# Patient Record
Sex: MALE | Race: WHITE
[De-identification: names, ages, dates, MRNs, and addresses within clinical notes are randomized per-mention and may not be internally consistent; named-entity substitution may affect disease eponyms.]

---

## 2023-01-02 ENCOUNTER — HOSPITAL ENCOUNTER (EMERGENCY)
Dept: HOSPITAL 75 - ER | Age: 63
Discharge: HOME | End: 2023-01-02
Payer: SELF-PAY

## 2023-01-02 VITALS — HEIGHT: 70.98 IN | BODY MASS INDEX: 23.15 KG/M2 | WEIGHT: 165.35 LBS

## 2023-01-02 VITALS — DIASTOLIC BLOOD PRESSURE: 93 MMHG | SYSTOLIC BLOOD PRESSURE: 141 MMHG
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
## 2023-01-02 DIAGNOSIS — Z28.310: ICD-10-CM

## 2023-01-02 DIAGNOSIS — F12.90: ICD-10-CM
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
## 2023-01-02 DIAGNOSIS — S39.91XA: Primary | ICD-10-CM

## 2023-01-02 DIAGNOSIS — F17.210: ICD-10-CM

## 2023-01-02 DIAGNOSIS — Z87.19: ICD-10-CM

## 2023-01-02 DIAGNOSIS — F15.90: ICD-10-CM

## 2023-01-02 LAB
ALBUMIN SERPL-MCNC: 4.5 GM/DL (ref 3.2–4.5)
ALP SERPL-CCNC: 93 U/L (ref 40–136)
ALT SERPL-CCNC: 38 U/L (ref 0–55)
AMYLASE SERPL-CCNC: 94 U/L (ref 25–125)
APTT BLD: 29 SEC (ref 24–35)
APTT PPP: YELLOW S
BACTERIA #/AREA URNS HPF: (no result) /HPF
BARBITURATES UR QL: NEGATIVE
BASOPHILS # BLD AUTO: 0.1 10^3/UL (ref 0–0.1)
BASOPHILS NFR BLD AUTO: 1 % (ref 0–10)
BENZODIAZ UR QL SCN: NEGATIVE
BILIRUB SERPL-MCNC: 0.4 MG/DL (ref 0.1–1)
BILIRUB UR QL STRIP: NEGATIVE
BUN/CREAT SERPL: 16
CALCIUM SERPL-MCNC: 9.3 MG/DL (ref 8.5–10.1)
CHLORIDE SERPL-SCNC: 102 MMOL/L (ref 98–107)
CK MB SERPL-MCNC: 1.4 NG/ML (ref ?–6.6)
CK SERPL-CCNC: 89 U/L (ref 30–200)
CO2 SERPL-SCNC: 23 MMOL/L (ref 21–32)
COCAINE UR QL: NEGATIVE
CREAT SERPL-MCNC: 0.86 MG/DL (ref 0.6–1.3)
EOSINOPHIL # BLD AUTO: 0.2 10^3/UL (ref 0–0.3)
EOSINOPHIL NFR BLD AUTO: 2 % (ref 0–10)
FIBRINOGEN PPP-MCNC: CLEAR MG/DL
GFR SERPLBLD BASED ON 1.73 SQ M-ARVRAT: 97 ML/MIN
GLUCOSE SERPL-MCNC: 102 MG/DL (ref 70–105)
GLUCOSE UR STRIP-MCNC: NEGATIVE MG/DL
HCT VFR BLD CALC: 47 % (ref 40–54)
HGB BLD-MCNC: 15.2 G/DL (ref 13.3–17.7)
INR PPP: 0.9 (ref 0.8–1.4)
KETONES UR QL STRIP: NEGATIVE
LEUKOCYTE ESTERASE UR QL STRIP: NEGATIVE
LYMPHOCYTES # BLD AUTO: 2.1 10^3/UL (ref 1–4)
LYMPHOCYTES NFR BLD AUTO: 25 % (ref 12–44)
MAGNESIUM SERPL-MCNC: 2.1 MG/DL (ref 1.6–2.4)
MANUAL DIFFERENTIAL PERFORMED BLD QL: NO
MCH RBC QN AUTO: 30 PG (ref 25–34)
MCHC RBC AUTO-ENTMCNC: 33 G/DL (ref 32–36)
MCV RBC AUTO: 91 FL (ref 80–99)
METHADONE UR QL SCN: NEGATIVE
MONOCYTES # BLD AUTO: 0.5 10^3/UL (ref 0–1)
MONOCYTES NFR BLD AUTO: 6 % (ref 0–12)
NEUTROPHILS # BLD AUTO: 5.5 10^3/UL (ref 1.8–7.8)
NEUTROPHILS NFR BLD AUTO: 65 % (ref 42–75)
NITRITE UR QL STRIP: NEGATIVE
OPIATES UR QL SCN: NEGATIVE
OXYCODONE UR QL: NEGATIVE
PH UR STRIP: 5.5 [PH] (ref 5–9)
PLATELET # BLD: 261 10^3/UL (ref 130–400)
PMV BLD AUTO: 10 FL (ref 9–12.2)
POTASSIUM SERPL-SCNC: 4.7 MMOL/L (ref 3.6–5)
PROPOXYPH UR QL: NEGATIVE
PROT SERPL-MCNC: 7.4 GM/DL (ref 6.4–8.2)
PROT UR QL STRIP: NEGATIVE
PROTHROMBIN TIME: 12.3 SEC (ref 12.2–14.7)
RBC #/AREA URNS HPF: (no result) /HPF
SODIUM SERPL-SCNC: 136 MMOL/L (ref 135–145)
SP GR UR STRIP: 1.02 (ref 1.02–1.02)
SQUAMOUS #/AREA URNS HPF: (no result) /HPF
TRICYCLICS UR QL SCN: NEGATIVE
WBC # BLD AUTO: 8.4 10^3/UL (ref 4.3–11)
WBC #/AREA URNS HPF: (no result) /HPF

## 2023-01-02 PROCEDURE — 80306 DRUG TEST PRSMV INSTRMNT: CPT

## 2023-01-02 PROCEDURE — 83874 ASSAY OF MYOGLOBIN: CPT

## 2023-01-02 PROCEDURE — 85730 THROMBOPLASTIN TIME PARTIAL: CPT

## 2023-01-02 PROCEDURE — 82553 CREATINE MB FRACTION: CPT

## 2023-01-02 PROCEDURE — 93041 RHYTHM ECG TRACING: CPT

## 2023-01-02 PROCEDURE — 71260 CT THORAX DX C+: CPT

## 2023-01-02 PROCEDURE — 82550 ASSAY OF CK (CPK): CPT

## 2023-01-02 PROCEDURE — 85025 COMPLETE CBC W/AUTO DIFF WBC: CPT

## 2023-01-02 PROCEDURE — 85610 PROTHROMBIN TIME: CPT

## 2023-01-02 PROCEDURE — 80053 COMPREHEN METABOLIC PANEL: CPT

## 2023-01-02 PROCEDURE — 81000 URINALYSIS NONAUTO W/SCOPE: CPT

## 2023-01-02 PROCEDURE — 71045 X-RAY EXAM CHEST 1 VIEW: CPT

## 2023-01-02 PROCEDURE — 74177 CT ABD & PELVIS W/CONTRAST: CPT

## 2023-01-02 PROCEDURE — 36415 COLL VENOUS BLD VENIPUNCTURE: CPT

## 2023-01-02 PROCEDURE — 99283 EMERGENCY DEPT VISIT LOW MDM: CPT

## 2023-01-02 PROCEDURE — 80320 DRUG SCREEN QUANTALCOHOLS: CPT

## 2023-01-02 PROCEDURE — 82150 ASSAY OF AMYLASE: CPT

## 2023-01-02 PROCEDURE — 83735 ASSAY OF MAGNESIUM: CPT

## 2023-01-02 NOTE — DIAGNOSTIC IMAGING REPORT
EXAMINATION: CT chest, abdomen and pelvis with intravenous

contrast.



TECHNIQUE: Multiple contiguous axial images were obtained through

the chest, abdomen and pelvis after the uneventful administration

of intravenous contrast. All CT scans use one or more of the

following dose optimizing techniques: automated exposure control,

MA and/or KvP adjustment based on patient size and exam type or

iterative reconstruction. 



HISTORY: Trauma. Fall. Chest and abdominal pain. Back pain.



COMPARISON: None available.



FINDINGS: 



CT CHEST:

The heart size is within normal limits. No pericardial effusion

is present. There is calcified aortic and coronary

atherosclerotic plaque.



There is no mediastinal, hilar, or axillary lymphadenopathy. 



Centrilobular emphysema is seen in the lungs. The lungs

demonstrate no pulmonary nodules or masses. There are no focal

areas of consolidation. No central endobronchial obstructing

lesions are identified. 



There are no pleural effusions or pneumothorax. 



The osseous structures demonstrate no acute abnormalities. 



CT ABDOMEN AND PELVIS:

The liver, spleen, pancreas, adrenal glands, and kidneys have an

unremarkable appearance without acute abnormalities.. Simple

cortical cysts are seen in both kidneys. The gallbladder is

nondistended. There is no pathologically enlarged mesenteric or

retroperitoneal adenopathy. 



The bowel loops are nondilated. The appendix is visualized in the

right lower quadrant and has a normal appearance. There is no

free fluid or free air. 



The osseous structures demonstrate no acute abnormalities. There

is calcified aortic and iliac atherosclerotic plaque.



Ureters and bladder have a normal appearance. 



There is no free air, loculated collection, or adenopathy in the

pelvis. 



IMPRESSION:

1. No acute abnormalities in the chest, abdomen and pelvis.

2. Centrilobular emphysema.



Agree with overnight report. 





Dictated by: 



  Dictated on workstation # [id number]

## 2023-01-02 NOTE — ED BACK PAIN
General


Chief Complaint:  Back Problems


Stated Complaint:  FALL,BACK PAIN


Nursing Triage Note:  


PT AMB TO ED BY POV WITH SON WITH C/O BACK INJURY. PT REPORTS HE FELL AND HIT 


BACK ON THE CORNER OF THE BED APPROX 2000 THIS EVENING. DESCRIBES PAIN AS 


BURNING AND RATES PAIN 7/10, 10/10 WITH MVMT.


Source of Information:  Patient (DIFFICULT HISTORIAN. SPEECH RAPID AND ERRATIC 

AND DIFFICULT TO UNDERSTAND. )





History of Present Illness


Date Seen by Provider:  Jan 2, 2023


Time Seen by Provider:  01:25


Initial Comments


PT ARRIVES VIA POV WITH HIS SON


PT STATES THAT AROUND 2000 TONIGHT, HE FELL AND LANDED ON THE CORNER OF A 

BED--PT AND SON ARE STAYING AT [facility]


HE C/O PAIN TO RIGHT FLANK AREA--STATES IT IS A BURNING PAIN


HE RATES PAIN 7/10 AT REST, 10/10 WITH MOVEMENT


HE STATES HE TOOK 3 BABY ASPIRIN TONIGHT. HAS NOT TAKEN ANYTHING ELSE FOR PAIN 


NO NAUSEA/VOMITING/DIARRHEA


NO DIFFICULTY URINATING


NO DIFFICULTY BREATHING





HE DENIES HITTING HIS HEAD OR HAVING LOSS OF CONSCIOUSNESS


DENIES NECK PAIN 


NO PARESTHESIAS OR MOTOR DEFICITS. 





HE HAD A "FEW BEERS" TONIGHT


HE DENIES DRUG USE





HE STATES HIS ONLY MEDICAL PROBLEM IS HTN. 





HE IS TRAVELING WITH HIS SON, WHO TRAVELS FOR WORK. 


PT STATES HE IS FROM [place]. 


SON REPORTS THEY WILL BE GOING BACK ON THE 9TH OF THIS MONTH.





Allergies and Home Medications


Allergies


Coded Allergies:  


     No Known Drug Allergies (Unverified , 1/2/23)





Review of Systems


Constitutional:  no symptoms reported


EENTM:  no symptoms reported


Respiratory:  no symptoms reported


Cardiovascular:  no symptoms reported


Gastrointestinal:  no symptoms reported


Genitourinary:  no symptoms reported


Musculoskeletal:  see HPI


Skin:  no symptoms reported


Psychiatric/Neurological:  No Symptoms Reported





Past Medical-Social-Family Hx


Patient Social History


Tobacco Use?:  Yes


Tobacco type used:  Cigarettes


Smoking Status:  Current Everyday Smoker


Use of E-Cig and/or Vaping dev:  No


Substance use?:  Yes


Substance type:  Methamphetamine, Marijuana


Alcohol Use?:  Yes


Alcohol type:  Beer


Alcohol Frequency:  Daily


Pt feels they are or have been:  No





Immunizations Up To Date


Influenza Vaccine Up-to-Date:  No; Not Current





Past Medical History


Surgery/Hospitalization HX:  


htn, hernia


Surgeries:  Yes (HERNIA REPAIR)


Abdominal


Respiratory:  No


Cardiac:  Yes


Hypertension


Neurological:  No


Genitourinary:  No


Gastrointestinal:  No


Musculoskeletal:  No


Endocrine:  No


HEENT:  No


Cancer:  No


Psychosocial:  No


Integumentary:  No


Blood Disorders:  No





Family Medical History








SOCIAL HISTORY:


-SMOKES 1 PPD


-REGULAR ETOH USE--WILL NOT STATE HOW MUCH HE NORMALLY DRINKS


-PT DENIES DRUG USE, BUT UDS IS + FOR METHAMPHETAMINS AND MARIJUANA ON


01/02/23





Physical Exam


Vital Signs





Vital Signs - First Documented








 1/2/23





 01:14


 


Temp 36.0


 


Pulse 61


 


Resp 16


 


B/P (MAP) 141/93 (109)


 


Pulse Ox 99


 


O2 Delivery Room Air





Capillary Refill : Less Than 3 Seconds


Height, Weight, BMI


Height: '"


Weight: lbs. oz. kg; 23.00 BMI


Method:


General Appearance:  No Apparent Distress, WD/WN, Other (PT IS SITTING ON SIDE 

OF BED READING A BOOK, AND APPEARS CALM. THEN ON EXAM, PT IS TALKING RAPIDLY AND

ERRATICALLY AND DIFFICULT TO KEEP ON SUBJECT AND WITH CONSTANT MOVEMENTS. 

MARKEDLY EXAGGERATED PAIN RESPONSE. PT WALKS UPRIGHT WITHOUT DIFFICULTY. )


HEENT:  PERRL/EOMI, Other (POOR DENTITION)


Neck:  Full Range of Motion, Normal Inspection, Non Tender, Supple


Cardiovascular:  Regular Rate, Rhythm, No Murmur


Respiratory:  Normal Breath Sounds, No Accessory Muscle Use, No Respiratory 

Distress, Other (RIGHT POSTERIOR AND LATERAL LOWER RIB AREAS. NO CREPITANCE OR 

DEFORMITY OR SUB Q AIR. )


Gastrointestinal:  Normal Bowel Sounds, Soft, Tenderness (RIGHT FLANK, AND 

ENTIRE RIGHT SIDE OF ABDOMEN TENDER. )


Back:  No Vertebral Tenderness, CVA Tenderness (R), Other (NO EXTERNAL EVIDENCE 

OF TRAUMA ANYWHERE)


Extremity:  Normal Capillary Refill, Normal Inspection, Normal Range of Motion, 

Non Tender, No Calf Tenderness, No Pedal Edema


Neurologic/Psychiatric:  Alert, Oriented x3, No Motor/Sensory Deficits, CNs II-

XII Norm as Tested


Skin:  Normal Color, Warm/Dry; No Rash; Other (NO EXTERNAL EVIDENCE OF TRAUMA 

ANYWHERE)





Progress/Results/Core Measures


Results/Orders


Lab Results





Laboratory Tests








Test


 1/2/23


01:40 1/2/23


01:53 Range/Units


 


 


Urine Color YELLOW    


 


Urine Clarity CLEAR    


 


Urine pH 5.5   5-9  


 


Urine Specific Gravity 1.025 H  1.016-1.022  


 


Urine Protein NEGATIVE   NEGATIVE  


 


Urine Glucose (UA) NEGATIVE   NEGATIVE  


 


Urine Ketones NEGATIVE   NEGATIVE  


 


Urine Nitrite NEGATIVE   NEGATIVE  


 


Urine Bilirubin NEGATIVE   NEGATIVE  


 


Urine Urobilinogen 0.2   < = 1.0  MG/DL


 


Urine Leukocyte Esterase NEGATIVE   NEGATIVE  


 


Urine RBC (Auto) NEGATIVE   NEGATIVE  


 


Urine RBC NONE    /HPF


 


Urine WBC 0-2    /HPF


 


Urine Squamous Epithelial


Cells 2-5 


 


  /HPF





 


Urine Crystals NONE    /LPF


 


Urine Bacteria TRACE    /HPF


 


Urine Casts NONE    /LPF


 


Urine Mucus SMALL H   /LPF


 


Urine Culture Indicated NO    


 


Urine Opiates Screen NEGATIVE   NEGATIVE  


 


Urine Oxycodone Screen NEGATIVE   NEGATIVE  


 


Urine Methadone Screen NEGATIVE   NEGATIVE  


 


Urine Propoxyphene Screen NEGATIVE   NEGATIVE  


 


Urine Barbiturates Screen NEGATIVE   NEGATIVE  


 


Ur Tricyclic Antidepressants


Screen NEGATIVE 


 


 NEGATIVE  





 


Urine Phencyclidine Screen NEGATIVE   NEGATIVE  


 


Urine Amphetamines Screen NEGATIVE   NEGATIVE  


 


Urine Methamphetamines Screen POSITIVE H  NEGATIVE  


 


Urine Benzodiazepines Screen NEGATIVE   NEGATIVE  


 


Urine Cocaine Screen NEGATIVE   NEGATIVE  


 


Urine Cannabinoids Screen POSITIVE H  NEGATIVE  


 


White Blood Count


 


 8.4 


 4.3-11.0


10^3/uL


 


Red Blood Count


 


 5.16 


 4.30-5.52


10^6/uL


 


Hemoglobin  15.2  13.3-17.7  g/dL


 


Hematocrit  47  40-54  %


 


Mean Corpuscular Volume  91  80-99  fL


 


Mean Corpuscular Hemoglobin  30  25-34  pg


 


Mean Corpuscular Hemoglobin


Concent 


 33 


 32-36  g/dL





 


Red Cell Distribution Width  13.6  10.0-14.5  %


 


Platelet Count


 


 261 


 130-400


10^3/uL


 


Mean Platelet Volume  10.0  9.0-12.2  fL


 


Immature Granulocyte % (Auto)  1   %


 


Neutrophils (%) (Auto)  65  42-75  %


 


Lymphocytes (%) (Auto)  25  12-44  %


 


Monocytes (%) (Auto)  6  0-12  %


 


Eosinophils (%) (Auto)  2  0-10  %


 


Basophils (%) (Auto)  1  0-10  %


 


Neutrophils # (Auto)


 


 5.5 


 1.8-7.8


10^3/uL


 


Lymphocytes # (Auto)


 


 2.1 


 1.0-4.0


10^3/uL


 


Monocytes # (Auto)


 


 0.5 


 0.0-1.0


10^3/uL


 


Eosinophils # (Auto)


 


 0.2 


 0.0-0.3


10^3/uL


 


Basophils # (Auto)


 


 0.1 


 0.0-0.1


10^3/uL


 


Immature Granulocyte # (Auto)


 


 0.0 


 0.0-0.1


10^3/uL


 


Prothrombin Time  12.3  12.2-14.7  SEC


 


INR Comment  0.9  0.8-1.4  


 


Activated Partial


Thromboplast Time 


 29 


 24-35  SEC





 


Sodium Level  136  135-145  MMOL/L


 


Potassium Level  4.7  3.6-5.0  MMOL/L


 


Chloride Level  102    MMOL/L


 


Carbon Dioxide Level  23  21-32  MMOL/L


 


Anion Gap  11  5-14  MMOL/L


 


Blood Urea Nitrogen  14  7-18  MG/DL


 


Creatinine


 


 0.86 


 0.60-1.30


MG/DL


 


Estimat Glomerular Filtration


Rate 


 97 


  





 


BUN/Creatinine Ratio  16   


 


Glucose Level  102    MG/DL


 


Calcium Level  9.3  8.5-10.1  MG/DL


 


Corrected Calcium  8.9  8.5-10.1  MG/DL


 


Magnesium Level  2.1  1.6-2.4  MG/DL


 


Total Bilirubin  0.4  0.1-1.0  MG/DL


 


Aspartate Amino Transf


(AST/SGOT) 


 31 


 5-34  U/L





 


Alanine Aminotransferase


(ALT/SGPT) 


 38 


 0-55  U/L





 


Alkaline Phosphatase  93    U/L


 


Total Creatine Kinase  89    U/L


 


Creatine Kinase MB  1.4  <6.6  NG/ML


 


Myoglobin


 


 40.4 


 10.0-92.0


NG/ML


 


Total Protein  7.4  6.4-8.2  GM/DL


 


Albumin  4.5  3.2-4.5  GM/DL


 


Amylase Level  94    U/L


 


Serum Alcohol  < 10  <10  MG/DL








My Orders





Orders - DENISE CARRERA DO


Ed Iv/Invasive Line Start (1/2/23 01:27)


Monitor-Rhythm Ecg Trace Only (1/2/23 01:27)


Alcohol (1/2/23 01:27)


Amylase (1/2/23 01:27)


Cbc With Automated Diff (1/2/23 01:27)


Comprehensive Metabolic Panel (1/2/23 01:27)


Creatine Kinase (1/2/23 01:27)


Creatine Kinase Mb (1/2/23 01:27)


Drug Screen Stat (Urine) (1/2/23 01:27)


Magnesium (1/2/23 01:27)


Protime With Inr (1/2/23 01:27)


Partial Thromboplastin Time (1/2/23 01:27)


Ua Culture If Indicated (1/2/23 01:27)


Myoglobin Serum (1/2/23 01:27)


Ct Chest/Abdomen/Pelvis W (1/2/23 01:35)


Chest 1 View, Ap/Pa Only (1/2/23 01:35)


Ketorolac Injection (Toradol Injection) (1/2/23 02:00)


Iohexol Injection (Omnipaque 350 Mg/Ml 1 (1/2/23 03:15)


Sodium Chloride Flush (Catheter Flush Sy (1/2/23 03:15)


Ns (Ivpb) (Sodium Chloride 0.9% Ivpb Bag (1/2/23 03:15)





Medications Given in ED





Current Medications








 Medications  Dose


 Ordered  Sig/Mami


 Route  Start Time


 Stop Time Status Last Admin


Dose Admin


 


 Iohexol  100 ml  ONCE  ONCE


 IV  1/2/23 03:15


 1/2/23 03:16 DC 1/2/23 03:04


80 ML


 


 Ketorolac


 Tromethamine  30 mg  ONCE  ONCE


 IVP  1/2/23 02:00


 1/2/23 02:01 DC 1/2/23 01:59


30 MG


 


 Sodium Chloride  10 ml  AS NEEDED  PRN


 IV  1/2/23 03:15


    1/2/23 03:04


10 ML


 


 Sodium Chloride  100 ml  ONCE  ONCE


 IV  1/2/23 03:15


 1/2/23 03:16 DC 1/2/23 03:04


80 ML








Vital Signs/I&O











 1/2/23





 01:14


 


Temp 36.0


 


Pulse 61


 


Resp 16


 


B/P (MAP) 141/93 (109)


 


Pulse Ox 99


 


O2 Delivery Room Air














Blood Pressure Mean:                    109











Progress


Progress Note :  


Progress Note





GIVEN IV FLUIDS AND TORADOL





NO RETURN FROM CT , PT I7 RESTING QUIETLY, RECLINING ON HIS BACK ON ER CART AND 

READING A BOOK. HE DOES NOT APPEAR TO BE IN ANY DISCOMFORT OR DISTRESS.





Diagnostic Imaging





Comments


CXR---PENDING RADIOLOGIST REVIEW





CT CHEST/ABDOMEN/PELVIS--PER [facility] VIA FAX AT


   Reviewed:  Reviewed by Me





Departure


Impression





   Primary Impression:  


   SELF REPORTED RIGHT FLANK INJURY


   Additional Impressions:  


   Methamphetamine use


   Marijuana use


Disposition:  01 HOME, SELF-CARE


Condition:  Stable





Departure-Patient Inst.


Decision time for Depature:  03:55


Referrals:  


NO,LOCAL PHYSICIAN (PCP/Family)


Primary Care Physician


Patient Instructions:  Contusion (DC), Marijuana Use and Addiction (DC), 

Methamphetamine





Add. Discharge Instructions:  


TYLENOL AND MOTRIN FOR PAIN 





FOLLOW UP WITH  OF CHOICE AS NEEDED





NO DRUGS 


NO ALCOHOL





All discharge instructions reviewed with patient and/or family. Voiced 

understanding.











DENISE CARRERA DO                  Jan 2, 2023 02:34

## 2023-01-02 NOTE — DIAGNOSTIC IMAGING REPORT
EXAMINATION: Chest 1 view



HISTORY: Trauma. Chest pain. Fall.



COMPARISON: None available.



FINDINGS: 



The lung volumes are normal. No focal consolidation is seen. No

large pleural effusion or pneumothorax is seen. The

cardiomediastinal silhouette is normal in size and contour. No

acute osseous abnormality is seen.



IMPRESSION: 



1. No acute pleuroparenchymal process.



Dictated by: 



  Dictated on workstation # [id number]